# Patient Record
Sex: FEMALE | Race: WHITE | HISPANIC OR LATINO | Employment: FULL TIME | URBAN - METROPOLITAN AREA
[De-identification: names, ages, dates, MRNs, and addresses within clinical notes are randomized per-mention and may not be internally consistent; named-entity substitution may affect disease eponyms.]

---

## 2020-06-25 ENCOUNTER — TELEPHONE (OUTPATIENT)
Dept: ORTHOPEDICS | Facility: CLINIC | Age: 60
End: 2020-06-25

## 2020-06-25 DIAGNOSIS — M79.641 RIGHT HAND PAIN: Primary | ICD-10-CM

## 2020-06-26 ENCOUNTER — TELEPHONE (OUTPATIENT)
Dept: ORTHOPEDICS | Facility: CLINIC | Age: 60
End: 2020-06-26

## 2020-06-26 NOTE — TELEPHONE ENCOUNTER
Call pt several times at both phone # to reschedule appt unable to reach her. She does not have mychart.

## 2020-07-10 ENCOUNTER — HOSPITAL ENCOUNTER (OUTPATIENT)
Dept: RADIOLOGY | Facility: HOSPITAL | Age: 60
Discharge: HOME | End: 2020-07-10
Attending: PHYSICIAN ASSISTANT
Payer: COMMERCIAL

## 2020-07-10 ENCOUNTER — OFFICE VISIT (OUTPATIENT)
Dept: ORTHOPEDICS | Facility: CLINIC | Age: 60
End: 2020-07-10
Payer: COMMERCIAL

## 2020-07-10 DIAGNOSIS — G56.03 BILATERAL CARPAL TUNNEL SYNDROME: ICD-10-CM

## 2020-07-10 DIAGNOSIS — M79.641 RIGHT HAND PAIN: ICD-10-CM

## 2020-07-10 DIAGNOSIS — M25.531 WRIST PAIN, RIGHT: Primary | ICD-10-CM

## 2020-07-10 PROCEDURE — 73130 X-RAY EXAM OF HAND: CPT | Mod: TC,PN,RT

## 2020-07-10 PROCEDURE — 73130 XR HAND COMPLETE 3 VIEW RIGHT: ICD-10-PCS | Mod: 26,RT,, | Performed by: RADIOLOGY

## 2020-07-10 PROCEDURE — 73130 X-RAY EXAM OF HAND: CPT | Mod: 26,RT,, | Performed by: RADIOLOGY

## 2020-07-10 PROCEDURE — 99202 OFFICE O/P NEW SF 15 MIN: CPT | Mod: S$GLB,,, | Performed by: PHYSICIAN ASSISTANT

## 2020-07-10 PROCEDURE — 99202 PR OFFICE/OUTPT VISIT, NEW, LEVL II, 15-29 MIN: ICD-10-PCS | Mod: S$GLB,,, | Performed by: PHYSICIAN ASSISTANT

## 2020-07-10 PROCEDURE — 99999 PR PBB SHADOW E&M-EST. PATIENT-LVL III: ICD-10-PCS | Mod: PBBFAC,,, | Performed by: PHYSICIAN ASSISTANT

## 2020-07-10 PROCEDURE — 99999 PR PBB SHADOW E&M-EST. PATIENT-LVL III: CPT | Mod: PBBFAC,,, | Performed by: PHYSICIAN ASSISTANT

## 2020-07-10 RX ORDER — MELOXICAM 7.5 MG/1
7.5 TABLET ORAL DAILY
COMMUNITY

## 2020-07-10 RX ORDER — OLMESARTAN MEDOXOMIL 20 MG/1
20 TABLET ORAL DAILY
COMMUNITY
End: 2020-08-12

## 2020-07-10 RX ORDER — GABAPENTIN 300 MG/1
300 CAPSULE ORAL NIGHTLY
COMMUNITY

## 2020-07-10 RX ORDER — LORATADINE 10 MG/1
10 TABLET ORAL DAILY PRN
COMMUNITY

## 2020-07-10 RX ORDER — FLUTICASONE PROPIONATE 50 MCG
1 SPRAY, SUSPENSION (ML) NASAL DAILY
COMMUNITY

## 2020-07-10 NOTE — PROGRESS NOTES
Subjective:      Patient ID: Gaby Ennis is a 59 y.o. female.    Chief Complaint: Pain of the Right Hand      HPI: Gaby Ennis is a 59-year-old female in clinic today for right wrist pain and bilateral hand pain.  Patient states that about 20 years ago she fractured her right distal ulna, but it did not require surgery.  She has not experience any problems from that, but recently her right wrist has begun hurting and she is unsure if it is related or not.  No other history of trauma.  Patient also states that sometimes her thumb index and middle finger of her bilateral hands will go numb and it will sometimes wake her up at night.  She states that she has gotten braces that she wears at night which help her sleep comfortably.  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  She has not received any treatment for this.    History reviewed. No pertinent past medical history.    Current Outpatient Medications:     fluticasone propionate (FLONASE) 50 mcg/actuation nasal spray, 1 spray by Each Nostril route once daily., Disp: , Rfl:     gabapentin (NEURONTIN) 300 MG capsule, Take 300 mg by mouth every evening., Disp: , Rfl:     loratadine (CLARITIN) 10 mg tablet, Take 10 mg by mouth once daily., Disp: , Rfl:     meloxicam (MOBIC) 7.5 MG tablet, Take 7.5 mg by mouth once daily., Disp: , Rfl:     olmesartan (BENICAR) 20 MG tablet, Take 20 mg by mouth once daily., Disp: , Rfl:   Review of patient's allergies indicates:   Allergen Reactions    Pcn [penicillins] Anaphylaxis       There were no vitals taken for this visit.    Review of Systems   Constitution: Negative for chills and fever.   HENT: Negative for congestion and hoarse voice.    Eyes: Negative for discharge and redness.   Cardiovascular: Negative for chest pain and palpitations.   Respiratory: Negative for cough and wheezing.    Skin: Negative for itching  and rash.   Musculoskeletal: Positive for joint pain. Negative for arthritis, joint swelling and stiffness.   Gastrointestinal: Negative for diarrhea, nausea and vomiting.   Neurological: Positive for numbness. Negative for dizziness.   Psychiatric/Behavioral: Negative for altered mental status.   Allergic/Immunologic: Negative for environmental allergies and persistent infections.           Objective:    Ortho Exam   Right wrist:  No TTP  No swelling is noted  ROM is full  Negative Finkelstein test    Bilateral hands:  Negative Tinel  Negative Phalen  Sensation is decreased over the thumb index and middle finger  Pulses are intact   strength is normal  ROM is full    GEN: Well developed, well nourished female. AAOX3. No acute distress.   Normocephalic, atraumatic.   ERIC  Breathing unlabored.  Mood and affect appropriate.        Assessment:     Imaging:  X-ray right hand obtained today shows no acute fracture dislocation.  No degenerative changes are noted        1. Wrist pain, right    2. Bilateral carpal tunnel syndrome          Plan:       I suggested we performed corticosteroid injection for the bilateral carpal tunnel and potentially the wrist pain as well, but the patient denied this saying that she would like to try something less invasive to start.  I suggested that we try physical therapy and the patient thinks this is a good idea.  We will send her to therapy for her bilateral hands and right wrist and re-evaluate in 6 weeks.  At that time we may consider corticosteroid injection.  Patient understands that there is potential that the carpal tunnel will require surgery if corticosteroid injections fail.    Orders Placed This Encounter    Ambulatory referral/consult to Physical/Occupational Therapy     Follow up in about 6 weeks (around 8/21/2020).

## 2020-07-10 NOTE — LETTER
July 10, 2020      Drake Mathur MD  [address]  [address]  [place] [address]           [place] [facility]  [address], [address]  [facility] [address]  Phone: [phone]          Patient: Gaby Ennis   MR Number: [id number]   YOB: 1960   Date of Visit: 7/10/2020       Dear Dr. Drake Mathur:    Thank you for referring Gaby Ennis to me for evaluation. Attached you will find relevant portions of my assessment and plan of care.    If you have questions, please do not hesitate to call me. I look forward to following Gaby Ennis along with you.    Sincerely,    Alfredo Nash PA-C    Enclosure  CC:  No Recipients    If you would like to receive this communication electronically, please contact [email or web address] or [phone] to request more information on [employer] Link access.    For providers and/or their staff who would like to refer a patient to [facility], please contact us through our one-stop-shop provider referral line, [facility], at [phone].    If you feel you have received this communication in error or would no longer like to receive these types of communications, please e-mail [email or web address]

## 2020-08-12 ENCOUNTER — OFFICE VISIT (OUTPATIENT)
Dept: CARDIOLOGY | Facility: CLINIC | Age: 60
End: 2020-08-12
Payer: COMMERCIAL

## 2020-08-12 VITALS
OXYGEN SATURATION: 95 % | WEIGHT: 287.25 LBS | HEART RATE: 84 BPM | SYSTOLIC BLOOD PRESSURE: 141 MMHG | BODY MASS INDEX: 49.04 KG/M2 | HEIGHT: 64 IN | DIASTOLIC BLOOD PRESSURE: 75 MMHG

## 2020-08-12 DIAGNOSIS — I10 ESSENTIAL HYPERTENSION: ICD-10-CM

## 2020-08-12 DIAGNOSIS — E66.01 MORBID OBESITY: ICD-10-CM

## 2020-08-12 DIAGNOSIS — M17.0 OSTEOARTHRITIS OF BOTH KNEES, UNSPECIFIED OSTEOARTHRITIS TYPE: ICD-10-CM

## 2020-08-12 DIAGNOSIS — R69 DIAGNOSIS DEFERRED: ICD-10-CM

## 2020-08-12 PROCEDURE — 99204 PR OFFICE/OUTPT VISIT, NEW, LEVL IV, 45-59 MIN: ICD-10-PCS | Mod: S$GLB,,, | Performed by: INTERNAL MEDICINE

## 2020-08-12 PROCEDURE — 93010 ELECTROCARDIOGRAM REPORT: CPT | Mod: S$GLB,,, | Performed by: INTERNAL MEDICINE

## 2020-08-12 PROCEDURE — 93005 EKG 12-LEAD: ICD-10-PCS | Mod: S$GLB,,, | Performed by: INTERNAL MEDICINE

## 2020-08-12 PROCEDURE — 93010 EKG 12-LEAD: ICD-10-PCS | Mod: S$GLB,,, | Performed by: INTERNAL MEDICINE

## 2020-08-12 PROCEDURE — 99999 PR PBB SHADOW E&M-EST. PATIENT-LVL IV: ICD-10-PCS | Mod: PBBFAC,,, | Performed by: INTERNAL MEDICINE

## 2020-08-12 PROCEDURE — 3008F PR BODY MASS INDEX (BMI) DOCUMENTED: ICD-10-PCS | Mod: CPTII,S$GLB,, | Performed by: INTERNAL MEDICINE

## 2020-08-12 PROCEDURE — 99999 PR PBB SHADOW E&M-EST. PATIENT-LVL IV: CPT | Mod: PBBFAC,,, | Performed by: INTERNAL MEDICINE

## 2020-08-12 PROCEDURE — 99204 OFFICE O/P NEW MOD 45 MIN: CPT | Mod: S$GLB,,, | Performed by: INTERNAL MEDICINE

## 2020-08-12 PROCEDURE — 93005 ELECTROCARDIOGRAM TRACING: CPT | Mod: S$GLB,,, | Performed by: INTERNAL MEDICINE

## 2020-08-12 PROCEDURE — 3008F BODY MASS INDEX DOCD: CPT | Mod: CPTII,S$GLB,, | Performed by: INTERNAL MEDICINE

## 2020-08-12 RX ORDER — HYDROCHLOROTHIAZIDE 12.5 MG/1
12.5 CAPSULE ORAL DAILY
COMMUNITY
Start: 2020-07-28

## 2020-08-12 RX ORDER — BILBERRY 100 MG
1 CAPSULE ORAL DAILY
COMMUNITY

## 2020-08-12 RX ORDER — DILTIAZEM HYDROCHLORIDE 120 MG/1
120 CAPSULE, COATED, EXTENDED RELEASE ORAL DAILY
Qty: 30 CAPSULE | Refills: 6 | Status: SHIPPED | OUTPATIENT
Start: 2020-08-12 | End: 2021-02-11

## 2020-08-12 RX ORDER — DULOXETIN HYDROCHLORIDE 30 MG/1
30 CAPSULE, DELAYED RELEASE ORAL 2 TIMES DAILY
COMMUNITY
Start: 2020-07-07

## 2020-08-12 RX ORDER — MONTELUKAST SODIUM 10 MG/1
10 TABLET ORAL DAILY
COMMUNITY
Start: 2020-06-25

## 2020-08-12 RX ORDER — DILTIAZEM HYDROCHLORIDE 120 MG/1
120 CAPSULE, COATED, EXTENDED RELEASE ORAL DAILY
COMMUNITY
End: 2020-08-12 | Stop reason: SDUPTHER

## 2020-08-12 NOTE — PROGRESS NOTES
Subjective:   Patient ID:  Gaby Ennis is a 60 y.o. female who presents for evaulation of Hypertension      HPI: Hypertensive [place] from [facility] 1 and a half year ago. Lived there for 10 years. Was followed regularly by a physician. She has a long standing hypertension. Was on Benicar, but upon return from [facility] it was changed to the generic Olmesartan. Patient developed peripheral edema and discontinued the medication.  She is now on Hctz.  Patient states that since she has been back she gained 40 lbs.  She is inactive, (although occasionally uses stationary bike ) due to difficulty ambulating due to DJD of both knees.  Her PCP is at [facility].  Patient c/o fatigue, leg swelling. She thinks she might be snoring at night, but is using Flonase and is not sure.    She has positive family history of CAD. Patient is not a smoker or a drinker.     History reviewed. No pertinent past medical history.    History reviewed. No pertinent surgical history.     ECG is normal.    Blood work is pending    Social History     Socioeconomic History    Marital status: Single     Spouse name: Not on file    Number of children: Not on file    Years of education: Not on file    Highest education level: Not on file   Occupational History    Not on file   Social Needs    Financial resource strain: Not on file    Food insecurity     Worry: Not on file     Inability: Not on file    Transportation needs     Medical: Not on file     Non-medical: Not on file   Tobacco Use    Smoking status: Never Smoker    Smokeless tobacco: Never Used   Substance and Sexual Activity    Alcohol use: Not Currently    Drug use: Not on file    Sexual activity: Not on file   Lifestyle    Physical activity     Days per week: Not on file     Minutes per session: Not on file    Stress: Not on file   Relationships    Social connections     Talks on phone: Not on file     Gets together: Not on file     Attends Anabaptism  "service: Not on file     Active member of club or organization: Not on file     Attends meetings of clubs or organizations: Not on file     Relationship status: Not on file   Other Topics Concern    Not on file   Social History Narrative    Not on file       Review of patient's allergies indicates:   Allergen Reactions    Pcn [penicillins] Anaphylaxis       No results found for: NA, K, CL, CO2, BUN, CREATININE, GLU, HGBA1C, MG, AST, ALT, ALBUMIN, PROT, BILITOT, WBC, HGB, HCT, MCV, PLT, INR, PSA, TSH, CHOL, HDL, LDLCALC, TRIG    Review of Systems   Constitution: Positive for malaise/fatigue and weight gain.   HENT: Positive for hearing loss.    Eyes: Negative.    Cardiovascular: Positive for dyspnea on exertion and leg swelling. Negative for chest pain, claudication, irregular heartbeat, near-syncope, palpitations and syncope.   Respiratory: Positive for wheezing. Negative for cough, shortness of breath and snoring.    Endocrine: Negative.  Negative for cold intolerance, heat intolerance, polydipsia, polyphagia and polyuria.   Skin: Negative.    Musculoskeletal: Positive for arthritis, back pain, joint pain and muscle cramps.   Gastrointestinal: Positive for constipation and heartburn.   Genitourinary: Negative.    Neurological: Positive for excessive daytime sleepiness, headaches, numbness and paresthesias.   Psychiatric/Behavioral: The patient is nervous/anxious.        Objective:   Physical Exam   Constitutional: She is oriented to person, place, and time. She appears well-developed and well-nourished.   BP (!) 141/75   Pulse 84   Ht 5' 4" (1.626 m)   Wt 130.3 kg (287 lb 4.2 oz)   SpO2 95%   BMI 49.31 kg/m²      HENT:   Head: Normocephalic.   Eyes: Pupils are equal, round, and reactive to light.   Neck: Normal range of motion. Neck supple. Carotid bruit is not present. No thyromegaly present.   Cardiovascular: Normal rate, regular rhythm, normal heart sounds and intact distal pulses. Exam reveals no gallop " and no friction rub.   No murmur heard.  Pulses:       Carotid pulses are 2+ on the right side and 2+ on the left side.       Radial pulses are 2+ on the right side and 2+ on the left side.        Femoral pulses are 2+ on the right side and 2+ on the left side.       Popliteal pulses are 2+ on the right side and 2+ on the left side.        Dorsalis pedis pulses are 2+ on the right side and 2+ on the left side.        Posterior tibial pulses are 2+ on the right side and 2+ on the left side.   Pulmonary/Chest: Effort normal and breath sounds normal. No respiratory distress. She has no wheezes. She has no rales. She exhibits no tenderness.   Abdominal: Soft. Bowel sounds are normal.   obese   Musculoskeletal: Normal range of motion.         General: No edema.      Comments: Varicose veins and venous stasis changes   Neurological: She is alert and oriented to person, place, and time.   Skin: Skin is warm and dry.   Psychiatric: She has a normal mood and affect.   Nursing note and vitals reviewed.      Current Outpatient Medications   Medication Sig    ALPHA LIPOIC ACID ORAL Take 1 capsule by mouth once daily. Pt unsure of dosage.    bilberry 100 mg Cap Take 1 capsule by mouth once daily.    cat's claw, uncaria tomentosa, 400 mg Cap Take 1 capsule by mouth once daily.    diltiaZEM (CARDIZEM CD) 120 MG Cp24 Take 1 capsule (120 mg total) by mouth once daily.    DULoxetine (CYMBALTA) 30 MG capsule Take 30 mg by mouth once daily.    fluticasone propionate (FLONASE) 50 mcg/actuation nasal spray 1 spray by Each Nostril route once daily.    gabapentin (NEURONTIN) 300 MG capsule Take 300 mg by mouth every evening.    hydroCHLOROthiazide (MICROZIDE) 12.5 mg capsule Take 12.5 mg by mouth once daily.    loratadine (CLARITIN) 10 mg tablet Take 10 mg by mouth once daily.    meloxicam (MOBIC) 7.5 MG tablet Take 7.5 mg by mouth once daily.    montelukast (SINGULAIR) 10 mg tablet Take 10 mg by mouth as needed.     No current  facility-administered medications for this visit.        Assessment and Plan:     Problem List Items Addressed This Visit        Cardiology Problems    Essential hypertension    Relevant Medications    diltiaZEM (CARDIZEM CD) 120 MG Cp24    Other Relevant Orders    IN OFFICE EKG 12-LEAD (to Muse)    Lipid Panel    TSH    Comprehensive metabolic panel    Hemoglobin A1C    Echo Color Flow Doppler? Yes    Cardiac PET Scan Stress       Other    Morbid obesity    Relevant Medications    diltiaZEM (CARDIZEM CD) 120 MG Cp24    Other Relevant Orders    IN OFFICE EKG 12-LEAD (to Muse)    Lipid Panel    TSH    Comprehensive metabolic panel    Hemoglobin A1C    Echo Color Flow Doppler? Yes    Cardiac PET Scan Stress    Osteoarthritis of both knees    Relevant Orders    Cardiac PET Scan Stress      Other Visit Diagnoses     Diagnosis deferred        Relevant Orders    IN OFFICE EKG 12-LEAD (to Muse)    Cardiac PET Scan Stress          Patient's Medications   New Prescriptions    No medications on file   Previous Medications    ALPHA LIPOIC ACID ORAL    Take 1 capsule by mouth once daily. Pt unsure of dosage.    BILBERRY 100 MG CAP    Take 1 capsule by mouth once daily.    CAT'S CLAW, UNCARIA TOMENTOSA, 400 MG CAP    Take 1 capsule by mouth once daily.    DULOXETINE (CYMBALTA) 30 MG CAPSULE    Take 30 mg by mouth once daily.    FLUTICASONE PROPIONATE (FLONASE) 50 MCG/ACTUATION NASAL SPRAY    1 spray by Each Nostril route once daily.    GABAPENTIN (NEURONTIN) 300 MG CAPSULE    Take 300 mg by mouth every evening.    HYDROCHLOROTHIAZIDE (MICROZIDE) 12.5 MG CAPSULE    Take 12.5 mg by mouth once daily.    LORATADINE (CLARITIN) 10 MG TABLET    Take 10 mg by mouth once daily.    MELOXICAM (MOBIC) 7.5 MG TABLET    Take 7.5 mg by mouth once daily.    MONTELUKAST (SINGULAIR) 10 MG TABLET    Take 10 mg by mouth as needed.   Modified Medications    Modified Medication Previous Medication    DILTIAZEM (CARDIZEM CD) 120 MG CP24 diltiaZEM  (CARDIZEM CD) 120 MG Cp24       Take 1 capsule (120 mg total) by mouth once daily.    Take 120 mg by mouth once daily.   Discontinued Medications    OLMESARTAN (BENICAR) 20 MG TABLET    Take 20 mg by mouth once daily.   Add Diltiazem  mg daily and monitor BP  Review ordered tests and advise.  Encouraged to discuss KAREN work up with her PCP.  Life style modifications weight loss and exercise as well as bariatric surgery discussed with the patient.  Follow up in about 6 months (around 2/12/2021).

## 2020-08-12 NOTE — LETTER
August 12, 2020      Drake Mathur MD  [address]  [address]  [place] [address]           [place] [facility]  [address]  [place] [address]  Phone: [phone]          Patient: Gaby Ennis   MR Number: [id number]   YOB: 1960   Date of Visit: 8/12/2020       Dear Dr. Drake Mathur:    Thank you for referring Gaby Ennis to me for evaluation. Attached you will find relevant portions of my assessment and plan of care.    If you have questions, please do not hesitate to call me. I look forward to following Gaby Ennis along with you.    Sincerely,    Brenda Pappas MD    Enclosure  CC:  No Recipients    If you would like to receive this communication electronically, please contact [email or web address] or [phone] to request more information on [employer] Link access.    For providers and/or their staff who would like to refer a patient to [facility], please contact us through our one-stop-shop provider referral line, [facility] , at [phone].    If you feel you have received this communication in error or would no longer like to receive these types of communications, please e-mail [email or web address]

## 2020-09-15 ENCOUNTER — CLINICAL SUPPORT (OUTPATIENT)
Dept: CARDIOLOGY | Facility: CLINIC | Age: 60
End: 2020-09-15
Attending: INTERNAL MEDICINE
Payer: COMMERCIAL

## 2020-09-15 ENCOUNTER — LAB VISIT (OUTPATIENT)
Dept: LAB | Facility: HOSPITAL | Age: 60
End: 2020-09-15
Attending: INTERNAL MEDICINE
Payer: COMMERCIAL

## 2020-09-15 ENCOUNTER — TELEPHONE (OUTPATIENT)
Dept: CARDIOLOGY | Facility: CLINIC | Age: 60
End: 2020-09-15

## 2020-09-15 VITALS
SYSTOLIC BLOOD PRESSURE: 94 MMHG | BODY MASS INDEX: 49 KG/M2 | WEIGHT: 287 LBS | DIASTOLIC BLOOD PRESSURE: 60 MMHG | HEIGHT: 64 IN | HEART RATE: 60 BPM

## 2020-09-15 DIAGNOSIS — I10 ESSENTIAL HYPERTENSION: ICD-10-CM

## 2020-09-15 DIAGNOSIS — E66.01 MORBID OBESITY: ICD-10-CM

## 2020-09-15 LAB
ALBUMIN SERPL BCP-MCNC: 3.8 G/DL (ref 3.5–5.2)
ALP SERPL-CCNC: 86 U/L (ref 55–135)
ALT SERPL W/O P-5'-P-CCNC: 50 U/L (ref 10–44)
ANION GAP SERPL CALC-SCNC: 11 MMOL/L (ref 8–16)
ASCENDING AORTA: 3.24 CM
AST SERPL-CCNC: 39 U/L (ref 10–40)
AV INDEX (PROSTH): 0.63
AV MEAN GRADIENT: 8 MMHG
AV PEAK GRADIENT: 13 MMHG
AV VALVE AREA: 1.75 CM2
AV VELOCITY RATIO: 0.57
BILIRUB SERPL-MCNC: 0.5 MG/DL (ref 0.1–1)
BSA FOR ECHO PROCEDURE: 2.42 M2
BUN SERPL-MCNC: 14 MG/DL (ref 6–20)
CALCIUM SERPL-MCNC: 8.9 MG/DL (ref 8.7–10.5)
CHLORIDE SERPL-SCNC: 105 MMOL/L (ref 95–110)
CHOLEST SERPL-MCNC: 215 MG/DL (ref 120–199)
CHOLEST/HDLC SERPL: 4.2 {RATIO} (ref 2–5)
CO2 SERPL-SCNC: 25 MMOL/L (ref 23–29)
CREAT SERPL-MCNC: 0.9 MG/DL (ref 0.5–1.4)
CV ECHO LV RWT: 0.35 CM
DOP CALC AO PEAK VEL: 1.83 M/S
DOP CALC AO VTI: 40.24 CM
DOP CALC LVOT AREA: 2.8 CM2
DOP CALC LVOT DIAMETER: 1.88 CM
DOP CALC LVOT PEAK VEL: 1.05 M/S
DOP CALC LVOT STROKE VOLUME: 70.53 CM3
DOP CALCLVOT PEAK VEL VTI: 25.42 CM
E WAVE DECELERATION TIME: 251.51 MSEC
E/A RATIO: 0.81
E/E' RATIO: 10.8 M/S
ECHO LV POSTERIOR WALL: 0.86 CM (ref 0.6–1.1)
EST. GFR  (AFRICAN AMERICAN): >60 ML/MIN/1.73 M^2
EST. GFR  (NON AFRICAN AMERICAN): >60 ML/MIN/1.73 M^2
ESTIMATED AVG GLUCOSE: 123 MG/DL (ref 68–131)
FRACTIONAL SHORTENING: 40 % (ref 28–44)
GLUCOSE SERPL-MCNC: 97 MG/DL (ref 70–110)
HBA1C MFR BLD HPLC: 5.9 % (ref 4–5.6)
HDLC SERPL-MCNC: 51 MG/DL (ref 40–75)
HDLC SERPL: 23.7 % (ref 20–50)
INTERVENTRICULAR SEPTUM: 0.91 CM (ref 0.6–1.1)
IVRT: 105.61 MSEC
LA MAJOR: 5.83 CM
LA MINOR: 5.7 CM
LA WIDTH: 3.96 CM
LDLC SERPL CALC-MCNC: 136.2 MG/DL (ref 63–159)
LEFT ATRIUM SIZE: 3.81 CM
LEFT ATRIUM VOLUME INDEX: 32.4 ML/M2
LEFT ATRIUM VOLUME: 73.92 CM3
LEFT INTERNAL DIMENSION IN SYSTOLE: 2.99 CM (ref 2.1–4)
LEFT VENTRICLE DIASTOLIC VOLUME INDEX: 51.26 ML/M2
LEFT VENTRICLE DIASTOLIC VOLUME: 116.9 ML
LEFT VENTRICLE MASS INDEX: 67 G/M2
LEFT VENTRICLE SYSTOLIC VOLUME INDEX: 15.3 ML/M2
LEFT VENTRICLE SYSTOLIC VOLUME: 34.78 ML
LEFT VENTRICULAR INTERNAL DIMENSION IN DIASTOLE: 4.98 CM (ref 3.5–6)
LEFT VENTRICULAR MASS: 153.72 G
LV LATERAL E/E' RATIO: 9 M/S
LV SEPTAL E/E' RATIO: 13.5 M/S
MV PEAK A VEL: 1 M/S
MV PEAK E VEL: 0.81 M/S
MV STENOSIS PRESSURE HALF TIME: 72.94 MS
MV VALVE AREA P 1/2 METHOD: 3.02 CM2
NONHDLC SERPL-MCNC: 164 MG/DL
PISA TR MAX VEL: 2.63 M/S
POTASSIUM SERPL-SCNC: 4.2 MMOL/L (ref 3.5–5.1)
PROT SERPL-MCNC: 7.2 G/DL (ref 6–8.4)
PULM VEIN S/D RATIO: 1.37
PV PEAK D VEL: 0.46 M/S
PV PEAK S VEL: 0.63 M/S
RA MAJOR: 4.76 CM
RA PRESSURE: 3 MMHG
RA WIDTH: 3.35 CM
RIGHT VENTRICULAR END-DIASTOLIC DIMENSION: 2.77 CM
SINUS: 3.15 CM
SODIUM SERPL-SCNC: 141 MMOL/L (ref 136–145)
STJ: 2.75 CM
TDI LATERAL: 0.09 M/S
TDI SEPTAL: 0.06 M/S
TDI: 0.08 M/S
TR MAX PG: 28 MMHG
TRICUSPID ANNULAR PLANE SYSTOLIC EXCURSION: 2.3 CM
TRIGL SERPL-MCNC: 139 MG/DL (ref 30–150)
TSH SERPL DL<=0.005 MIU/L-ACNC: 2.09 UIU/ML (ref 0.4–4)
TV REST PULMONARY ARTERY PRESSURE: 31 MMHG

## 2020-09-15 PROCEDURE — 99999 PR PBB SHADOW E&M-EST. PATIENT-LVL II: ICD-10-PCS | Mod: PBBFAC,,,

## 2020-09-15 PROCEDURE — 80053 COMPREHEN METABOLIC PANEL: CPT

## 2020-09-15 PROCEDURE — 84443 ASSAY THYROID STIM HORMONE: CPT

## 2020-09-15 PROCEDURE — 80061 LIPID PANEL: CPT

## 2020-09-15 PROCEDURE — 99999 PR PBB SHADOW E&M-EST. PATIENT-LVL II: CPT | Mod: PBBFAC,,,

## 2020-09-15 PROCEDURE — 83036 HEMOGLOBIN GLYCOSYLATED A1C: CPT

## 2020-09-15 PROCEDURE — 36415 COLL VENOUS BLD VENIPUNCTURE: CPT | Mod: PO

## 2020-09-15 NOTE — TELEPHONE ENCOUNTER
Lian notified, verbalized understanding. Lian gave me pt's phone number [phone]. Pt notified, verbalized understanding. Pt stated that her systolic bp was in 140's last week at a doctor appointment outside of [facility] and in the 90's today. Pt stated that she feels better and not having chest pressure. Please advise.

## 2020-09-15 NOTE — TELEPHONE ENCOUNTER
----- Message from Brenda Pappas MD sent at 9/15/2020 10:55 AM CDT -----  Please inform the patient that that her cardiac echo was normal. Great news.  How is she doing on a new medication? Is BP better controlled/

## 2020-09-15 NOTE — TELEPHONE ENCOUNTER
----- Message from Brenda Pappas MD sent at 9/15/2020  3:06 PM CDT -----  Please inform the patient that her lipids are not at goal.  She has increased 10 yr risk of CAD.  I would recommend moderate intensity statin.  If patient agrees please prescribe Lipitor 20 mg daily and repeat lipids and LFT's in 2 months.

## 2020-09-16 DIAGNOSIS — I10 ESSENTIAL HYPERTENSION: Primary | ICD-10-CM

## 2020-09-16 DIAGNOSIS — E66.01 MORBID OBESITY: ICD-10-CM

## 2020-09-16 RX ORDER — ATORVASTATIN CALCIUM 20 MG/1
20 TABLET, FILM COATED ORAL DAILY
Qty: 30 TABLET | Refills: 6 | Status: SHIPPED | OUTPATIENT
Start: 2020-09-16 | End: 2021-04-08

## 2020-09-16 RX ORDER — ATORVASTATIN CALCIUM 20 MG/1
20 TABLET, FILM COATED ORAL DAILY
COMMUNITY
End: 2020-09-16 | Stop reason: SDUPTHER

## 2020-09-16 NOTE — TELEPHONE ENCOUNTER
Pt notified, verbalized understanding. Pt stated that she is not going to do the pet stress test at this time because she can't afford it. Dr. Pappas made aware.

## 2020-11-27 ENCOUNTER — LAB VISIT (OUTPATIENT)
Dept: LAB | Facility: HOSPITAL | Age: 60
End: 2020-11-27
Attending: INTERNAL MEDICINE
Payer: COMMERCIAL

## 2020-11-27 DIAGNOSIS — I10 ESSENTIAL HYPERTENSION: ICD-10-CM

## 2020-11-27 DIAGNOSIS — E66.01 MORBID OBESITY: ICD-10-CM

## 2020-11-27 LAB
ALBUMIN SERPL BCP-MCNC: 3.6 G/DL (ref 3.5–5.2)
ALP SERPL-CCNC: 99 U/L (ref 55–135)
ALT SERPL W/O P-5'-P-CCNC: 43 U/L (ref 10–44)
AST SERPL-CCNC: 34 U/L (ref 10–40)
BILIRUB DIRECT SERPL-MCNC: 0.2 MG/DL (ref 0.1–0.3)
BILIRUB SERPL-MCNC: 0.5 MG/DL (ref 0.1–1)
CHOLEST SERPL-MCNC: 140 MG/DL (ref 120–199)
CHOLEST/HDLC SERPL: 2.5 {RATIO} (ref 2–5)
HDLC SERPL-MCNC: 56 MG/DL (ref 40–75)
HDLC SERPL: 40 % (ref 20–50)
LDLC SERPL CALC-MCNC: 68.4 MG/DL (ref 63–159)
NONHDLC SERPL-MCNC: 84 MG/DL
PROT SERPL-MCNC: 6.8 G/DL (ref 6–8.4)
TRIGL SERPL-MCNC: 78 MG/DL (ref 30–150)

## 2020-11-27 PROCEDURE — 80061 LIPID PANEL: CPT

## 2020-11-27 PROCEDURE — 80076 HEPATIC FUNCTION PANEL: CPT

## 2020-11-27 PROCEDURE — 36415 COLL VENOUS BLD VENIPUNCTURE: CPT | Mod: PO

## 2020-11-30 ENCOUNTER — TELEPHONE (OUTPATIENT)
Dept: CARDIOLOGY | Facility: CLINIC | Age: 60
End: 2020-11-30

## 2020-11-30 NOTE — TELEPHONE ENCOUNTER
Pt notified of results, verbalized understanding.     ----- Message from Brenda Pappas MD sent at 11/30/2020 10:52 AM CST -----  Please inform the patient that lipids look very good.

## 2020-12-11 ENCOUNTER — TELEPHONE (OUTPATIENT)
Dept: VASCULAR SURGERY | Facility: CLINIC | Age: 60
End: 2020-12-11

## 2020-12-17 ENCOUNTER — TELEPHONE (OUTPATIENT)
Dept: CARDIOLOGY | Facility: CLINIC | Age: 60
End: 2020-12-17

## 2020-12-17 NOTE — TELEPHONE ENCOUNTER
----- Message from Yasmin Kee sent at 12/17/2020  9:49 AM CST -----  Regarding: advice  Pls call pt work [phone].  She says the swelling in her feet is getting worse and needs advice on what she should do.    Thank you

## 2020-12-17 NOTE — TELEPHONE ENCOUNTER
Pt called stating that she has been having swelling in feet. Pt stated that she saw an orthopedic doctor outside of [facility] and was advised to go to a vascular doctor and has not talked to anyone to schedule an appointment. Pt stated that she is watching her salt intake and has been elevating her feet at home and it helps a little. Pt wants to know what she can do because the swelling is getting worse. Please advise.

## 2020-12-21 ENCOUNTER — OFFICE VISIT (OUTPATIENT)
Dept: CARDIOLOGY | Facility: CLINIC | Age: 60
End: 2020-12-21
Payer: COMMERCIAL

## 2020-12-21 VITALS
HEART RATE: 68 BPM | WEIGHT: 286.63 LBS | SYSTOLIC BLOOD PRESSURE: 134 MMHG | DIASTOLIC BLOOD PRESSURE: 70 MMHG | BODY MASS INDEX: 46.06 KG/M2 | HEIGHT: 66 IN

## 2020-12-21 DIAGNOSIS — E66.01 MORBID OBESITY: ICD-10-CM

## 2020-12-21 DIAGNOSIS — I89.0 LYMPHEDEMA OF BOTH LOWER EXTREMITIES: ICD-10-CM

## 2020-12-21 DIAGNOSIS — M79.672 LEFT FOOT PAIN: ICD-10-CM

## 2020-12-21 DIAGNOSIS — I83.813 VARICOSE VEINS OF BOTH LOWER EXTREMITIES WITH PAIN: ICD-10-CM

## 2020-12-21 DIAGNOSIS — R60.0 EDEMA OF LEFT FOOT: Primary | ICD-10-CM

## 2020-12-21 DIAGNOSIS — I10 ESSENTIAL HYPERTENSION: ICD-10-CM

## 2020-12-21 PROBLEM — I83.93 VARICOSE VEINS OF BOTH LOWER EXTREMITIES: Status: ACTIVE | Noted: 2020-12-21

## 2020-12-21 PROCEDURE — 99999 PR PBB SHADOW E&M-EST. PATIENT-LVL IV: ICD-10-PCS | Mod: PBBFAC,,, | Performed by: INTERNAL MEDICINE

## 2020-12-21 PROCEDURE — 1125F AMNT PAIN NOTED PAIN PRSNT: CPT | Mod: S$GLB,,, | Performed by: INTERNAL MEDICINE

## 2020-12-21 PROCEDURE — 3008F BODY MASS INDEX DOCD: CPT | Mod: CPTII,S$GLB,, | Performed by: INTERNAL MEDICINE

## 2020-12-21 PROCEDURE — 99999 PR PBB SHADOW E&M-EST. PATIENT-LVL IV: CPT | Mod: PBBFAC,,, | Performed by: INTERNAL MEDICINE

## 2020-12-21 PROCEDURE — 3008F PR BODY MASS INDEX (BMI) DOCUMENTED: ICD-10-PCS | Mod: CPTII,S$GLB,, | Performed by: INTERNAL MEDICINE

## 2020-12-21 PROCEDURE — 99205 OFFICE O/P NEW HI 60 MIN: CPT | Mod: S$GLB,,, | Performed by: INTERNAL MEDICINE

## 2020-12-21 PROCEDURE — 1125F PR PAIN SEVERITY QUANTIFIED, PAIN PRESENT: ICD-10-PCS | Mod: S$GLB,,, | Performed by: INTERNAL MEDICINE

## 2020-12-21 PROCEDURE — 99205 PR OFFICE/OUTPT VISIT, NEW, LEVL V, 60-74 MIN: ICD-10-PCS | Mod: S$GLB,,, | Performed by: INTERNAL MEDICINE

## 2020-12-21 RX ORDER — LORAZEPAM 0.5 MG/1
0.5 TABLET ORAL ONCE
Qty: 1 TABLET | Refills: 0 | Status: SHIPPED | OUTPATIENT
Start: 2020-12-21 | End: 2020-12-21

## 2020-12-21 RX ORDER — BUMETANIDE 0.5 MG/1
0.5 TABLET ORAL 2 TIMES DAILY
Qty: 60 TABLET | Refills: 11 | Status: SHIPPED | OUTPATIENT
Start: 2020-12-21 | End: 2021-12-21

## 2020-12-21 NOTE — LETTER
December 21, 2020      Brenda Pappas MD  [address]  [address]  [place] [address]           [facility] 8th Fl  [address].  [place] [address]  Phone: [phone]          Patient: Gaby Ennis   MR Number: [id number]   YOB: 1960   Date of Visit: 12/21/2020       Dear Dr. Brenda Pappas:    Thank you for referring Gaby Ennis to me for evaluation. Attached you will find relevant portions of my assessment and plan of care.    If you have questions, please do not hesitate to call me. I look forward to following Gaby Ennis along with you.    Sincerely,    Lior Pham MD PhD    Enclosure  CC:  No Recipients    If you would like to receive this communication electronically, please contact [email or web address] or [phone] to request more information on [employer] Link access.    For providers and/or their staff who would like to refer a patient to [facility], please contact us through our one-stop-shop provider referral line, [facility], at [phone].    If you feel you have received this communication in error or would no longer like to receive these types of communications, please e-mail [email or web address]

## 2020-12-21 NOTE — PATIENT INSTRUCTIONS
Assessment/Plan:  Gaby Ennis is a 60 y.o. female with HTN, HLD, morbid obesity, who presents for an initial appointment.    1. Left foot edema with pain- Likely due to lymphedema with possible venous insufficiency.  Check BLE venous reflux study and arterial ultrasound.  Start bumex 0.5 mg bid.  Check MRI left foot with contrast to evaluate for possible structural abnormalities. Pt to elevate legs when resting.  Limit sodium intake to 2,000 mg/day.      2. Morbid Obesity- Refer to nutrition for assistance with weight loss.     Follow up in 1 month

## 2020-12-21 NOTE — PROGRESS NOTES
[facility]      Chief Complaint   Patient presents with    Foot Swelling       Patient ID: Gaby Ennis is a 60 y.o. female with HTN, HLD, morbid obesity, who presents for an initial appointment.  Pertinent history/events are as follows:     -Pt kindly referred by Dr. Pappas for evaluation of foot swelling.      HPI  Mrs. Ennis reports swelling of the left foot since 2/2020.  Swelling became worse over the past month.  Also reports constant pain in the left foot, which started this month (12/2020).  Exam shows BLE's with prominent varicose veins and changes consistent with lymphedema.  Pitting edema noted at left ankly.      No past medical history on file.  No past surgical history on file.  Social History     Socioeconomic History    Marital status: Single     Spouse name: Not on file    Number of children: Not on file    Years of education: Not on file    Highest education level: Not on file   Occupational History    Not on file   Social Needs    Financial resource strain: Not on file    Food insecurity     Worry: Not on file     Inability: Not on file    Transportation needs     Medical: Not on file     Non-medical: Not on file   Tobacco Use    Smoking status: Never Smoker    Smokeless tobacco: Never Used   Substance and Sexual Activity    Alcohol use: Not Currently    Drug use: Not on file    Sexual activity: Not on file   Lifestyle    Physical activity     Days per week: Not on file     Minutes per session: Not on file    Stress: Not on file   Relationships    Social connections     Talks on phone: Not on file     Gets together: Not on file     Attends [employer] service: Not on file     Active member of club or organization: Not on file     Attends meetings of clubs or organizations: Not on file     Relationship status: Not on file   Other Topics Concern    Not on file   Social History Narrative    Not on file     Family History   Problem Relation Age of Onset     Hyperlipidemia Mother     Heart attack Father     Heart disease Father     Hypertension Father     Hyperlipidemia Brother     Stroke Maternal Grandfather     Heart failure Neg Hx        Review of patient's allergies indicates:   Allergen Reactions    Pcn [penicillins] Anaphylaxis       Medication List with Changes/Refills   Current Medications    ALPHA LIPOIC ACID ORAL    Take 1 capsule by mouth once daily. Pt unsure of dosage.    ATORVASTATIN (LIPITOR) 20 MG TABLET    Take 1 tablet (20 mg total) by mouth once daily.    BILBERRY 100 MG CAP    Take 1 capsule by mouth once daily.    CAT'S CLAW, UNCARIA TOMENTOSA, 400 MG CAP    Take 1 capsule by mouth once daily.    DILTIAZEM (CARDIZEM CD) 120 MG CP24    Take 1 capsule (120 mg total) by mouth once daily.    DULOXETINE (CYMBALTA) 30 MG CAPSULE    Take 30 mg by mouth 2 (two) times daily.     FLUTICASONE PROPIONATE (FLONASE) 50 MCG/ACTUATION NASAL SPRAY    1 spray by Each Nostril route once daily.    GABAPENTIN (NEURONTIN) 300 MG CAPSULE    Take 300 mg by mouth every evening.    HYDROCHLOROTHIAZIDE (MICROZIDE) 12.5 MG CAPSULE    Take 12.5 mg by mouth once daily.    LORATADINE (CLARITIN) 10 MG TABLET    Take 10 mg by mouth daily as needed.     MELOXICAM (MOBIC) 7.5 MG TABLET    Take 7.5 mg by mouth once daily.    MONTELUKAST (SINGULAIR) 10 MG TABLET    Take 10 mg by mouth once daily.     NAPROXEN SODIUM (ALEVE ORAL)    Take 2 tablets by mouth once daily.    PHENAZOPYRIDINE HCL (AZO ORAL)    Take 1 tablet by mouth once daily.       Review of Systems  Constitution: Denies chills, fever, and sweats.  HENT: Denies headaches or blurry vision.  Cardiovascular: Denies chest pain or irregular heart beat.  Respiratory: Denies cough or shortness of breath.  Gastrointestinal: Denies abdominal pain, nausea, or vomiting.  Musculoskeletal: Positive for left foot swelling and pan.  Neurological: Denies dizziness or focal weakness.  Psychiatric/Behavioral: Normal mental  "status.  Hematologic/Lymphatic: Denies bleeding problem or easy bruising/bleeding.  Skin: Denies rash or suspicious lesions    Physical Examination  /70   Pulse 68   Ht 5' 6" (1.676 m)   Wt 130 kg (286 lb 9.6 oz)   BMI 46.26 kg/m²     Constitutional: Morbidly obese female, no acute distress, conversant  HEENT: Sclera anicteric, Pupils equal, round and reactive to light, extraocular motions intact, Oropharynx clear  Neck: No JVD, no carotid bruits  Cardiovascular: regular rate and rhythm, no murmur, rubs or gallops, normal S1/S2  Pulmonary: Clear to auscultation bilaterally  Abdominal: Abdomen soft, nontender, nondistended, positive bowel sounds  Extremities: BLE's with prominent varicose veins and changes consistent with lymphedema.  Pitting edema noted at left ankly.    Pulses:  Carotid pulses are 2+ on the right side, and 2+ on the left side.  Radial pulses are 2+ on the right side, and 2+ on the left side.   Femoral pulses are 2+ on the right side, and 2+ on the left side.  Popliteal pulses are 2+ on the right side, and 2+ on the left side.   Dorsalis pedis pulses are 2+ on the right side, and 2+ on the left side.   Posterior tibial pulses are 2+ on the right side, and 2+ on the left side.    Skin: No ecchymosis, erythema, or ulcers  Psych: Alert and oriented x 3, appropriate affect  Neuro: CNII-XII intact, no focal deficits    Labs:  Most Recent Data  CBC: No results found for: WBC, HGB, HCT, PLT, MCV, RDW  BMP:   Lab Results   Component Value Date     09/15/2020    K 4.2 09/15/2020     09/15/2020    CO2 25 09/15/2020    BUN 14 09/15/2020    CREATININE 0.9 09/15/2020    GLU 97 09/15/2020    CALCIUM 8.9 09/15/2020     LFTS;   Lab Results   Component Value Date    PROT 6.8 11/27/2020    ALBUMIN 3.6 11/27/2020    BILITOT 0.5 11/27/2020    AST 34 11/27/2020    ALKPHOS 99 11/27/2020    ALT 43 11/27/2020     COAGS: No results found for: INR, PROTIME, PTT  FLP:   Lab Results   Component Value Date "    CHOL 140 11/27/2020    HDL 56 11/27/2020    LDLCALC 68.4 11/27/2020    TRIG 78 11/27/2020    CHOLHDL 40.0 11/27/2020     CARDIAC: No results found for: TROPONINI, CKMB, BNP      Echo 9/15/2020:  · Normal left ventricular systolic function. The estimated ejection fraction is 65%.  · Normal right ventricular systolic function.  · Normal LV diastolic function.  · No wall motion abnormalities.  · The estimated PA systolic pressure is 31 mmHg.  · Normal central venous pressure (3 mmHg).      Assessment/Plan:  Gaby Ennis is a 60 y.o. female with HTN, HLD, morbid obesity, who presents for an initial appointment.    1. Left foot edema with pain- Likely due to lymphedema with possible venous insufficiency.  Check BLE venous reflux study and arterial ultrasound.  Start bumex 0.5 mg bid.  Check MRI left foot with contrast to evaluate for possible structural abnormalities. Pt to elevate legs when resting.  Limit sodium intake to 2,000 mg/day.      2. Morbid Obesity- Refer to nutrition for assistance with weight loss.     Follow up in 1 month    Total duration of face to face visit time 30 minutes.  Total time spent counseling greater than fifty percent of total visit time.  Counseling included discussion regarding imaging findings, diagnosis, possibilities, treatment options, risks and benefits.  The patient had many questions regarding the options and long-term effects.    Lior Pham MD, PhD  Interventional Cardiology

## 2021-01-13 ENCOUNTER — PATIENT MESSAGE (OUTPATIENT)
Dept: CARDIOLOGY | Facility: CLINIC | Age: 61
End: 2021-01-13

## 2021-01-14 ENCOUNTER — TELEPHONE (OUTPATIENT)
Dept: CARDIOLOGY | Facility: CLINIC | Age: 61
End: 2021-01-14

## 2021-02-17 ENCOUNTER — TELEPHONE (OUTPATIENT)
Dept: CARDIOLOGY | Facility: CLINIC | Age: 61
End: 2021-02-17

## 2021-04-16 ENCOUNTER — PATIENT MESSAGE (OUTPATIENT)
Dept: RESEARCH | Facility: HOSPITAL | Age: 61
End: 2021-04-16

## 2022-10-08 ENCOUNTER — OFFICE VISIT (OUTPATIENT)
Dept: URGENT CARE | Facility: CLINIC | Age: 62
End: 2022-10-08
Payer: OTHER MISCELLANEOUS

## 2022-10-08 VITALS
OXYGEN SATURATION: 95 % | DIASTOLIC BLOOD PRESSURE: 75 MMHG | HEIGHT: 66 IN | BODY MASS INDEX: 46.06 KG/M2 | SYSTOLIC BLOOD PRESSURE: 154 MMHG | TEMPERATURE: 98 F | HEART RATE: 76 BPM | RESPIRATION RATE: 18 BRPM | WEIGHT: 286.63 LBS

## 2022-10-08 DIAGNOSIS — Z02.6 ENCOUNTER RELATED TO WORKER'S COMPENSATION CLAIM: Primary | ICD-10-CM
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
## 2022-10-08 DIAGNOSIS — S63.501A SPRAIN OF RIGHT WRIST, INITIAL ENCOUNTER: ICD-10-CM

## 2022-10-08 DIAGNOSIS — S80.02XA CONTUSION OF LEFT KNEE, INITIAL ENCOUNTER: ICD-10-CM

## 2022-10-08 DIAGNOSIS — M25.531 RIGHT WRIST PAIN: ICD-10-CM

## 2022-10-08 PROCEDURE — 73110 X-RAY EXAM OF WRIST: CPT | Mod: FY,RT,S$GLB, | Performed by: RADIOLOGY

## 2022-10-08 PROCEDURE — 73564 X-RAY EXAM KNEE 4 OR MORE: CPT | Mod: FY,LT,S$GLB, | Performed by: RADIOLOGY

## 2022-10-08 PROCEDURE — 99203 OFFICE O/P NEW LOW 30 MIN: CPT | Mod: S$GLB,,, | Performed by: PHYSICIAN ASSISTANT

## 2022-10-08 PROCEDURE — 73130 X-RAY EXAM OF HAND: CPT | Mod: FY,RT,S$GLB, | Performed by: RADIOLOGY

## 2022-10-08 PROCEDURE — 99203 PR OFFICE/OUTPT VISIT, NEW, LEVL III, 30-44 MIN: ICD-10-PCS | Mod: S$GLB,,, | Performed by: PHYSICIAN ASSISTANT

## 2022-10-08 PROCEDURE — 73564 XR KNEE COMP 4 OR MORE VIEWS LEFT: ICD-10-PCS | Mod: FY,LT,S$GLB, | Performed by: RADIOLOGY

## 2022-10-08 PROCEDURE — 73110 XR WRIST COMPLETE 3 VIEWS RIGHT: ICD-10-PCS | Mod: FY,RT,S$GLB, | Performed by: RADIOLOGY

## 2022-10-08 PROCEDURE — 73130 XR HAND COMPLETE 3 VIEW RIGHT: ICD-10-PCS | Mod: FY,RT,S$GLB, | Performed by: RADIOLOGY

## 2022-10-08 NOTE — PATIENT INSTRUCTIONS
PLEASE READ YOUR DISCHARGE INSTRUCTIONS ENTIRELY AS IT CONTAINS IMPORTANT INFORMATION.  - OTC Tylenol/anti-inflammatory every 6-8 hours with food as needed for pain.  - continue heat/ice compression, rice therapy, and muscle stretches.  Continue wrist brace as needed.    - You need to evaluated by Occupational medicine to determine your return to work status.     - if no improvement or worsening symptoms, recommend follow-up with *Occupational medicine for further evaluation and for work related injury.  Please call the number below to set up appointment; a referral has been placed. Or you can choose another location on form given.     [facility]  [address] [place] [address]  Please call [phone] OR [phone]    -You must understand that you've received an Urgent Care treatment only and that you may be released before all your medical problems are known or treated. You, the patient, will arrange for follow up care.    - If your condition worsens or fails to improve we recommend that you receive another evaluation at the emergency room immediately. Strict ER versus clinic precautions given.      RED FLAGS/WARNING SYMPTOMS DISCUSSED WITH PATIENT THAT WOULD WARRANT EMERGENT MEDICAL ATTENTION. Patient aware and verbalized understanding.

## 2022-10-08 NOTE — PROGRESS NOTES
"Subjective:       Patient ID: Gaby Ennis is a 62 y.o. female.    Vitals:  height is 5' 6" (1.676 m) and weight is 130 kg (286 lb 9.6 oz). Her oral temperature is 98.3 °F (36.8 °C). Her blood pressure is 154/75 (abnormal) and her pulse is 76. Her respiration is 18 and oxygen saturation is 95%.     Chief Complaint: Injury    62-YEAR-OLD FEMALE PRESENTS URGENT CARE CLINIC FOR EVALUATION.  Patient states that she in injured herself yesterday at work around 4:30 p.m.. Patient tripped over the table leg and fell on her left knee and right wrist/hand.  Left knee pain has significantly improved.  Right wrist and hand pain is the worst.  There is significant bruising and swelling present.  Improved compared to yesterday with wear a soft wrist brace.  Patient tried taking Ibuprofen and had some relief.  No open wound, loss of sensation, or focal weakness/deficits.    Injury  This is a new problem. The current episode started yesterday. The problem occurs constantly. The problem has been gradually improving. Associated symptoms include arthralgias and joint swelling. Pertinent negatives include no abdominal pain, chest pain, chills, congestion, coughing, diaphoresis, fatigue, fever, headaches, myalgias, nausea, neck pain, numbness, rash, sore throat, vertigo, vomiting or weakness. The symptoms are aggravated by twisting, walking and bending. She has tried NSAIDs and ice for the symptoms. The treatment provided mild relief.     Constitution: Negative for activity change, appetite change, chills, sweating, fatigue, fever and generalized weakness.   HENT:  Negative for ear pain, hearing loss, facial swelling, congestion, postnasal drip, sinus pain, sinus pressure, sore throat, trouble swallowing and voice change.    Neck: Negative for neck pain, neck stiffness and painful lymph nodes.   Cardiovascular:  Negative for chest pain, leg swelling, palpitations, sob on exertion and passing out.   Eyes:  Negative for eye " discharge, eye pain, photophobia, vision loss, double vision and blurred vision.   Respiratory:  Negative for chest tightness, cough, sputum production, bloody sputum, COPD, shortness of breath, stridor, wheezing and asthma.    Gastrointestinal:  Negative for abdominal pain, nausea, vomiting, constipation, diarrhea, bright red blood in stool, rectal bleeding, heartburn and bowel incontinence.   Genitourinary:  Negative for dysuria, frequency, urgency, urine decreased, flank pain, bladder incontinence and hematuria.   Musculoskeletal:  Positive for trauma, joint pain and joint swelling. Negative for abnormal ROM of joint, muscle cramps and muscle ache.   Skin:  Positive for bruising. Negative for color change, pale, rash and wound.   Allergic/Immunologic: Negative for seasonal allergies, asthma and immunocompromised state.   Neurological:  Negative for dizziness, history of vertigo, light-headedness, passing out, facial drooping, speech difficulty, coordination disturbances, loss of balance, headaches, disorientation, altered mental status, loss of consciousness, numbness, tingling and seizures.   Hematologic/Lymphatic: Negative for swollen lymph nodes, easy bruising/bleeding and trouble clotting. Does not bruise/bleed easily.   Psychiatric/Behavioral:  Negative for altered mental status and disorientation.            Objective:      Physical Exam   Constitutional: She appears well-developed. She is cooperative.  Non-toxic appearance. She does not appear ill. No distress.   HENT:   Head: Normocephalic and atraumatic.   Ears:   Right Ear: Hearing, external ear and ear canal normal.   Left Ear: Hearing, external ear and ear canal normal.   Nose: Nose normal.   Mouth/Throat: Uvula is midline, oropharynx is clear and moist and mucous membranes are normal. Mucous membranes are moist. No posterior oropharyngeal edema. No tonsillar exudate. Oropharynx is clear.   Eyes: Conjunctivae, EOM and lids are normal. Pupils are  equal, round, and reactive to light. Right eye exhibits no discharge. Left eye exhibits no discharge. Extraocular movement intact   Neck: Neck supple. No neck rigidity present.   Cardiovascular: Normal rate, regular rhythm and normal pulses.   Pulmonary/Chest: Effort normal. No accessory muscle usage. No respiratory distress. She has no wheezes. She exhibits no tenderness.   Abdominal: Normal appearance. She exhibits no distension. Soft. There is no abdominal tenderness.   Musculoskeletal: Normal range of motion.         General: Swelling, tenderness and signs of injury present. Normal range of motion.      Right lower leg: No edema.      Left lower leg: No edema.      Comments: Moves all extremities with normal tone, strength, and ROM.    Slightly antalgic gait on left knee.  Superficial ecchymosis on left anterior knee.  Full strength and ROM with left knee with no laxity of joint with anterior maneuver, valgus, and varus maneuvers.    Right hand and wrists with edema, superficial ecchymosis, and tenderness to palpation that is generalized.  In soft wrist brace from home.   Neurological: no focal deficit. She is alert and at baseline. She has normal motor skills and normal sensation. She displays no weakness, facial symmetry and normal reflexes. No cranial nerve deficit or sensory deficit. She exhibits normal muscle tone. Gait and coordination normal. Coordination normal.   Skin: Skin is warm, dry, not diaphoretic and no rash. Capillary refill takes less than 2 seconds.   Psychiatric: Her behavior is normal. Mood and thought content normal.   Nursing note and vitals reviewed.      XR WRIST COMPLETE 3 VIEWS RIGHT    Result Date: 10/8/2022  EXAMINATION: XR WRIST COMPLETE 3 VIEWS RIGHT; XR HAND COMPLETE 3 VIEW RIGHT CLINICAL HISTORY: Unspecified fall, initial encounter TECHNIQUE: PA, lateral, and oblique views of the right wrist and right hand were performed. COMPARISON: Right hand series 07/10/2020 FINDINGS: Overall  alignment is within normal limits.  Similar tiny ossific density adjacent to the ulnar styloid tip without donor site or significant overlying soft tissue swelling likely accessory ossicle or sequela of remote trauma.  Subtle radiolucency with apparent cortical step-off projected over the triquetrum on the oblique views.  No dislocation or destructive osseous process.  Baseline minimal DJD about the wrist.  No subcutaneous emphysema or radiodense retained foreign body.     Questionable nondisplaced fracture of the triquetrum versus artifact from overlapping osseous interfaces.  Correlate for point tenderness at this region. Electronically signed by: Santos Johnston MD Date:    10/08/2022 Time:    13:25    XR HAND COMPLETE 3 VIEW RIGHT    Result Date: 10/8/2022  EXAMINATION: XR WRIST COMPLETE 3 VIEWS RIGHT; XR HAND COMPLETE 3 VIEW RIGHT CLINICAL HISTORY: Unspecified fall, initial encounter TECHNIQUE: PA, lateral, and oblique views of the right wrist and right hand were performed. COMPARISON: Right hand series 07/10/2020 FINDINGS: Overall alignment is within normal limits.  Similar tiny ossific density adjacent to the ulnar styloid tip without donor site or significant overlying soft tissue swelling likely accessory ossicle or sequela of remote trauma.  Subtle radiolucency with apparent cortical step-off projected over the triquetrum on the oblique views.  No dislocation or destructive osseous process.  Baseline minimal DJD about the wrist.  No subcutaneous emphysema or radiodense retained foreign body.     Questionable nondisplaced fracture of the triquetrum versus artifact from overlapping osseous interfaces.  Correlate for point tenderness at this region. Electronically signed by: Santos Johnston MD Date:    10/08/2022 Time:    13:25    X-Ray Knee Complete 4 or More Views Left    Result Date: 10/8/2022  EXAMINATION: XR KNEE COMP 4 OR MORE VIEWS LEFT CLINICAL HISTORY: Unspecified fall, initial encounter TECHNIQUE: Three  views COMPARISON: None FINDINGS: Overall alignment is within normal limits.  No displaced fracture, dislocation or destructive osseous process.  No large suprapatellar joint effusion.  Overall mild to moderate tricompartmental degenerative change.  No subcutaneous emphysema or radiodense retained foreign body.  Enthesophyte at the superior patellar pole.     No acute displaced fracture-dislocation identified. Electronically signed by: Santos Johnston MD Date:    10/08/2022 Time:    13:17     Assessment:       1. Encounter related to worker's compensation claim    2. Fall, initial encounter    3. Contusion of left knee, initial encounter    4. Sprain of right wrist, initial encounter    5. Right wrist pain        62 y.o. female who presents with work related injury. Neurovascularly intact. Xrays done which did not show any knee fracture or dislocation.  Right wrist and hand x-ray with questionable nondisplaced fracture of the triquetrum.  Discussed RICE therapy and OTC pain med prn.  Discussed Orthoglass splinting versus hard wrist brace.  Patient opted for hard wrist brace.  Patient will follow up with Occupational Medicine Monday for work related injury.     Patient needs to be evaluated by Occupational medicine to determine return to work status.      Return Appointment:  Please call to schedule or present around 8am-4pm for evaluation at [facility] [place] [address]    Please call [phone] or [phone]         Plan:         Encounter related to worker's compensation claim  -     Non-DOT Drug Screen  -     XR WRIST COMPLETE 3 VIEWS RIGHT; Future; Expected date: 10/08/2022  -     XR HAND COMPLETE 3 VIEW RIGHT; Future; Expected date: 10/08/2022  -     X-Ray Knee Complete 4 or More Views Left; Future; Expected date: 10/08/2022  -     Ambulatory referral/consult to Occupational Medicine    Fall, initial encounter  -     XR WRIST COMPLETE 3 VIEWS RIGHT; Future;  Expected date: 10/08/2022  -     XR HAND COMPLETE 3 VIEW RIGHT; Future; Expected date: 10/08/2022  -     X-Ray Knee Complete 4 or More Views Left; Future; Expected date: 10/08/2022  -     Ambulatory referral/consult to Occupational Medicine    Contusion of left knee, initial encounter  -     X-Ray Knee Complete 4 or More Views Left; Future; Expected date: 10/08/2022  -     Ambulatory referral/consult to Occupational Medicine    Sprain of right wrist, initial encounter  -     XR WRIST COMPLETE 3 VIEWS RIGHT; Future; Expected date: 10/08/2022  -     XR HAND COMPLETE 3 VIEW RIGHT; Future; Expected date: 10/08/2022  -     Ambulatory referral/consult to Occupational Medicine    Right wrist pain  -     WRIST BRACE FOR HOME USE            Additional MDM:     Heart Failure Score:   COPD = No

## 2022-10-08 NOTE — LETTER
[facility]  [address]  [place] [address]  Phone: [phone]  Fax: [phone]  [facility]: [phone]    Pt Name: Gaby Ennis  Injury Date: 10/07/2022   Employee ID: [id number] Date of First Treatment: 10/08/2022   Company: [employer] reference to record [id number]      Appointment Time: 09:15 AM Arrived: 10:56A   Provider: David Fernandes PA-C Time Out:3PM     Office Treatment:   1. Encounter related to worker's compensation claim    2. Fall, initial encounter    3. Contusion of left knee, initial encounter    4. Sprain of right wrist, initial encounter                       Return Appointment:  Please call to schedule or present around 8am-4pm for evaluation at [facility] at [address] [address]    Please call [phone] or [phone]

## 2022-10-10 ENCOUNTER — TELEPHONE (OUTPATIENT)
Dept: URGENT CARE | Facility: CLINIC | Age: 62
End: 2022-10-10
Payer: COMMERCIAL

## 2022-10-13 ENCOUNTER — OFFICE VISIT (OUTPATIENT)
Dept: URGENT CARE | Facility: CLINIC | Age: 62
End: 2022-10-13
Payer: OTHER MISCELLANEOUS

## 2022-10-13 VITALS
HEIGHT: 66 IN | TEMPERATURE: 98 F | DIASTOLIC BLOOD PRESSURE: 81 MMHG | BODY MASS INDEX: 45.96 KG/M2 | WEIGHT: 286 LBS | HEART RATE: 73 BPM | SYSTOLIC BLOOD PRESSURE: 141 MMHG

## 2022-10-13 DIAGNOSIS — S80.02XA CONTUSION OF LEFT KNEE, INITIAL ENCOUNTER: ICD-10-CM

## 2022-10-13 DIAGNOSIS — Z02.6 ENCOUNTER RELATED TO WORKER'S COMPENSATION CLAIM: Primary | ICD-10-CM

## 2022-10-13 DIAGNOSIS — S63.501A SPRAIN OF RIGHT WRIST, INITIAL ENCOUNTER: ICD-10-CM

## 2022-10-13 DIAGNOSIS — S60.211A CONTUSION OF RIGHT WRIST, INITIAL ENCOUNTER: ICD-10-CM

## 2022-10-13 DIAGNOSIS — S62.101A FRACTURE OF UNSPECIFIED CARPAL BONE, RIGHT WRIST, INITIAL ENCOUNTER FOR CLOSED FRACTURE: ICD-10-CM

## 2022-10-13 PROCEDURE — 99214 OFFICE O/P EST MOD 30 MIN: CPT | Mod: S$GLB,,,

## 2022-10-13 PROCEDURE — 73110 X-RAY EXAM OF WRIST: CPT | Mod: RT,S$GLB,, | Performed by: RADIOLOGY

## 2022-10-13 PROCEDURE — 99214 PR OFFICE/OUTPT VISIT, EST, LEVL IV, 30-39 MIN: ICD-10-PCS | Mod: S$GLB,,,

## 2022-10-13 PROCEDURE — 73110 XR WRIST COMPLETE 3 VIEWS RIGHT: ICD-10-PCS | Mod: RT,S$GLB,, | Performed by: RADIOLOGY

## 2022-10-13 NOTE — PROGRESS NOTES
Subjective:       Patient ID: Gaby Ennis is a 62 y.o. female.    Chief Complaint: Wrist Injury (Right)    See MA note.  She denies any symptoms to her left knee other than the resolving bruising.  She continues to have pain and limited mobility of the right wrist even though swelling has gone down considerably.  She has difficulty lifting with her right hand or performing ADLs such as trying to brush her hair.  Previously she had been provided a thumb spica wrist brace that was too large and difficult to use special when driving.  Therefore she has been using a soft brace in the interim.    New Injury Right Wrist (DOI 10/7/22). 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 She is here to follow up from an . She tripped over a table and fell on her left knee and right wrist/hand. She states that her right wrist and hand still hurts. She states that the swelling has gone down some. She states that over the weekend the pain was much worse. There was bruising and swelling present. She continues to wear a soft wrist brace. Her pain is 2/10.    EC          Wrist Injury     Constitution: Positive for activity change.   HENT: Negative.     Neck: neck negative.   Cardiovascular: Negative.    Eyes: Negative.    Respiratory: Negative.     Gastrointestinal: Negative.    Endocrine: negative.   Genitourinary: Negative.    Musculoskeletal:  Positive for pain, joint pain, joint swelling, abnormal ROM of joint and muscle ache.   Skin:  Positive for bruising. Negative for color change.   Neurological:  Positive for tingling.      Objective:      Physical Exam  Vitals and nursing note reviewed.   Constitutional:       General: She is not in acute distress.  HENT:      Head: Normocephalic.   Eyes:      General: Lids are normal.      Conjunctiva/sclera: Conjunctivae normal.   Musculoskeletal:      Right wrist: Swelling, tenderness and bony tenderness present. No deformity. Decreased range of motion. Normal pulse.      Right hand:  Normal range of motion.        Hands:       Cervical back: Normal range of motion.      Left knee: No swelling or deformity. Normal range of motion.      Comments: Mild swelling and resolving ecchymosis noted to the right wrist.  Tenderness on palpation across the distal ulna and radius area.  Decreased right wrist range of motion in all planes.  Normal left knee range of motion with resolving ecchymosis noted.   Skin:     General: Skin is warm.      Capillary Refill: Capillary refill takes less than 2 seconds.      Findings: Bruising present.   Neurological:      General: No focal deficit present.      Mental Status: She is alert and oriented to person, place, and time.   Psychiatric:         Attention and Perception: Attention normal.         Mood and Affect: Mood and affect normal.         Speech: Speech normal.         Behavior: Behavior normal. Behavior is cooperative.       X-Ray Wrist Complete Right    Result Date: 10/13/2022  EXAMINATION: XR WRIST COMPLETE 3 VIEWS RIGHT CLINICAL HISTORY: Contusion of right wrist, initial encounter TECHNIQUE: PA, lateral, and oblique views of the right wrist were performed. COMPARISON: 10/08/2022 FINDINGS: Bones are well mineralized.  Tiny ossific density again seen adjacent to the ulnar styloid.  Once again, this likely represents an accessory ossicle or old trauma.  Irregularity of the cortex of the triquetrum is again noted on the oblique view.  Findings are again concerning for a recent, nondisplaced fracture.  Correlate with mechanism of injury and precise location of pain.  No other evidence of fracture or dislocation is seen.  No definite soft tissue abnormality appreciated.     Irregularity of the cortex of the triquetrum is again seen and remains concerning for a possible recent nondisplaced fracture.  Correlate with mechanism of injury and precise location of pain. This report was flagged in Epic as abnormal. Electronically signed by: Gus Tapia MD  Date:    10/13/2022 Time:    13:08      Assessment:       1. Encounter related to worker's compensation claim    2. Sprain of right wrist, initial encounter    3. Contusion of left knee, initial encounter    4. Contusion of right wrist, initial encounter    5. Fracture of unspecified carpal bone, right wrist, initial encounter for closed fracture          Plan:       Based on her account it sounds as though the thumb spica wrist brace is too large.  I will provide her with a simple brace to help secure her right wrist.  Referral to Hand Ortho for further evaluation of fracture will be placed.  Will have a follow-up in 2 weeks time to insure timely follow-up.  She may use over-the-counter ibuprofen and Tylenol as directed on label.     Patient Instructions: Referral to specialist to be scheduled, once authorized   Restrictions:  (No use of right hand or wrist.  Must wear brace)  Follow up in about 2 weeks (around 10/27/2022).

## 2022-10-13 NOTE — LETTER
[facility]  [address] [address]  Phone: [phone]  Fax: [phone]  [facility]: [email or web address]    Pt Name: Gaby Ennis  Injury Date: 10/07/2022   Employee ID: [id number] Date of First Treatment: 10/13/2022   Company: [employer] to [employer]      Appointment Time: 11:15 AM Arrived: 11:30am   Provider: [place], DO Time Out:2:00pm     Office Treatment:   1. Encounter related to worker's compensation claim    2. Sprain of right wrist, initial encounter    3. Contusion of left knee, initial encounter    4. Contusion of right wrist, initial encounter    5. Fracture of unspecified carpal bone, right wrist, initial encounter for closed fracture          Patient Instructions: Referral to specialist to be scheduled, once authorized      Restrictions:  (No use of right hand or wrist.  Must wear brace)     Return Appointment: 10/27/2022 at 3:00pm.   EC

## 2023-08-10 ENCOUNTER — OCCUPATIONAL HEALTH (OUTPATIENT)
Dept: URGENT CARE | Facility: CLINIC | Age: 63
End: 2023-08-10

## 2023-08-10 DIAGNOSIS — Z00.00 ENCOUNTER FOR PHYSICAL EXAMINATION: Primary | ICD-10-CM

## 2023-08-10 LAB — BREATH ALCOHOL: 0

## 2023-08-10 PROCEDURE — 82075 ASSAY OF BREATH ETHANOL: CPT | Mod: S$GLB,,, | Performed by: NURSE PRACTITIONER

## 2023-08-10 PROCEDURE — 82075 POCT BREATH ALCOHOL TEST: ICD-10-PCS | Mod: S$GLB,,, | Performed by: NURSE PRACTITIONER

## 2023-08-10 PROCEDURE — 80305 OOH COLLECTION ONLY DRUG SCREEN: ICD-10-PCS | Mod: S$GLB,,, | Performed by: NURSE PRACTITIONER

## 2023-08-10 PROCEDURE — 80305 DRUG TEST PRSMV DIR OPT OBS: CPT | Mod: S$GLB,,, | Performed by: NURSE PRACTITIONER

## 2023-08-10 PROCEDURE — 99499 PHYSICAL, BASIC COMPLEXITY: ICD-10-PCS | Mod: S$GLB,,, | Performed by: NURSE PRACTITIONER

## 2023-08-10 PROCEDURE — 99499 UNLISTED E&M SERVICE: CPT | Mod: S$GLB,,, | Performed by: NURSE PRACTITIONER

## 2023-11-09 ENCOUNTER — CLINICAL SUPPORT (OUTPATIENT)
Dept: OTHER | Facility: CLINIC | Age: 63
End: 2023-11-09
Payer: COMMERCIAL

## 2023-11-09 DIAGNOSIS — Z00.8 ENCOUNTER FOR OTHER GENERAL EXAMINATION: ICD-10-CM

## 2023-11-10 ENCOUNTER — LAB VISIT (OUTPATIENT)
Dept: LAB | Facility: HOSPITAL | Age: 63
End: 2023-11-10
Attending: OTOLARYNGOLOGY
Payer: COMMERCIAL

## 2023-11-10 ENCOUNTER — OFFICE VISIT (OUTPATIENT)
Dept: OTOLARYNGOLOGY | Facility: CLINIC | Age: 63
End: 2023-11-10
Payer: COMMERCIAL

## 2023-11-10 VITALS
BODY MASS INDEX: 46.95 KG/M2 | DIASTOLIC BLOOD PRESSURE: 73 MMHG | SYSTOLIC BLOOD PRESSURE: 130 MMHG | HEART RATE: 73 BPM | WEIGHT: 287.13 LBS | HEIGHT: 64 IN | SYSTOLIC BLOOD PRESSURE: 135 MMHG | WEIGHT: 275 LBS | DIASTOLIC BLOOD PRESSURE: 84 MMHG | BODY MASS INDEX: 49.29 KG/M2

## 2023-11-10 DIAGNOSIS — J32.8 OTHER CHRONIC SINUSITIS: Primary | Chronic | ICD-10-CM

## 2023-11-10 DIAGNOSIS — J34.3 HYPERTROPHY OF INFERIOR NASAL TURBINATE: Chronic | ICD-10-CM

## 2023-11-10 DIAGNOSIS — J30.9 CHRONIC ALLERGIC RHINITIS: Chronic | ICD-10-CM

## 2023-11-10 LAB
GLUCOSE SERPL-MCNC: 126 MG/DL (ref 60–140)
HDLC SERPL-MCNC: 39 MG/DL
POC CHOLESTEROL, LDL (DOCK): 159 MG/DL
POC CHOLESTEROL, TOTAL: 240 MG/DL
TRIGL SERPL-MCNC: 229 MG/DL

## 2023-11-10 PROCEDURE — 3078F PR MOST RECENT DIASTOLIC BLOOD PRESSURE < 80 MM HG: ICD-10-PCS | Mod: CPTII,S$GLB,, | Performed by: OTOLARYNGOLOGY

## 2023-11-10 PROCEDURE — 1160F RVW MEDS BY RX/DR IN RCRD: CPT | Mod: CPTII,S$GLB,, | Performed by: OTOLARYNGOLOGY

## 2023-11-10 PROCEDURE — 86003 ALLG SPEC IGE CRUDE XTRC EA: CPT | Performed by: OTOLARYNGOLOGY

## 2023-11-10 PROCEDURE — 99999 PR PBB SHADOW E&M-EST. PATIENT-LVL IV: ICD-10-PCS | Mod: PBBFAC,,, | Performed by: OTOLARYNGOLOGY

## 2023-11-10 PROCEDURE — 1159F MED LIST DOCD IN RCRD: CPT | Mod: CPTII,S$GLB,, | Performed by: OTOLARYNGOLOGY

## 2023-11-10 PROCEDURE — 3075F SYST BP GE 130 - 139MM HG: CPT | Mod: CPTII,S$GLB,, | Performed by: OTOLARYNGOLOGY

## 2023-11-10 PROCEDURE — 99999 PR PBB SHADOW E&M-EST. PATIENT-LVL IV: CPT | Mod: PBBFAC,,, | Performed by: OTOLARYNGOLOGY

## 2023-11-10 PROCEDURE — 3008F PR BODY MASS INDEX (BMI) DOCUMENTED: ICD-10-PCS | Mod: CPTII,S$GLB,, | Performed by: OTOLARYNGOLOGY

## 2023-11-10 PROCEDURE — 1160F PR REVIEW ALL MEDS BY PRESCRIBER/CLIN PHARMACIST DOCUMENTED: ICD-10-PCS | Mod: CPTII,S$GLB,, | Performed by: OTOLARYNGOLOGY

## 2023-11-10 PROCEDURE — 86003 ALLG SPEC IGE CRUDE XTRC EA: CPT | Mod: 59 | Performed by: OTOLARYNGOLOGY

## 2023-11-10 PROCEDURE — 3008F BODY MASS INDEX DOCD: CPT | Mod: CPTII,S$GLB,, | Performed by: OTOLARYNGOLOGY

## 2023-11-10 PROCEDURE — 99204 PR OFFICE/OUTPT VISIT, NEW, LEVL IV, 45-59 MIN: ICD-10-PCS | Mod: S$GLB,,, | Performed by: OTOLARYNGOLOGY

## 2023-11-10 PROCEDURE — 36415 COLL VENOUS BLD VENIPUNCTURE: CPT | Performed by: OTOLARYNGOLOGY

## 2023-11-10 PROCEDURE — 3078F DIAST BP <80 MM HG: CPT | Mod: CPTII,S$GLB,, | Performed by: OTOLARYNGOLOGY

## 2023-11-10 PROCEDURE — 1159F PR MEDICATION LIST DOCUMENTED IN MEDICAL RECORD: ICD-10-PCS | Mod: CPTII,S$GLB,, | Performed by: OTOLARYNGOLOGY

## 2023-11-10 PROCEDURE — 99204 OFFICE O/P NEW MOD 45 MIN: CPT | Mod: S$GLB,,, | Performed by: OTOLARYNGOLOGY

## 2023-11-10 PROCEDURE — 3075F PR MOST RECENT SYSTOLIC BLOOD PRESS GE 130-139MM HG: ICD-10-PCS | Mod: CPTII,S$GLB,, | Performed by: OTOLARYNGOLOGY

## 2023-11-10 RX ORDER — METHYLPREDNISOLONE 4 MG/1
TABLET ORAL
Qty: 1 EACH | Refills: 0 | Status: SHIPPED | OUTPATIENT
Start: 2023-11-10

## 2023-11-10 RX ORDER — CLARITHROMYCIN 500 MG/1
500 TABLET, FILM COATED ORAL EVERY 12 HOURS
Qty: 20 TABLET | Refills: 0 | Status: SHIPPED | OUTPATIENT
Start: 2023-11-10 | End: 2023-11-20

## 2023-11-10 NOTE — PROGRESS NOTES
Chief Complaint   Patient presents with    Sinus Problem   .    HPI:     Gaby Ennis is a 63 y.o. female who presents for evaluation of a several month history of nasal congestion and postnasal drip.  She notes that 6 weeks ago she had URI symptoms with cough. She took allegra and some prednisone that she had on hand.  She reports that the symptoms have not improved. She describes difficulty breathing at night. There is bilateral nasal obstruction. She does use sinus rinses or nasal sprays. She admits to midface pain and pressure. She does report headaches.  She has had sinus or nasal surgery. She reports history of septoplasty in 1990s. There is no history of sinonasal trauma. Reports severe childhood allergies.           History reviewed. No pertinent past medical history.  Social History     Socioeconomic History    Marital status: Single   Tobacco Use    Smoking status: Never    Smokeless tobacco: Never   Substance and Sexual Activity    Alcohol use: Not Currently     History reviewed. No pertinent surgical history.  Family History   Problem Relation Age of Onset    Hyperlipidemia Mother     Heart attack Father     Heart disease Father     Hypertension Father     Hyperlipidemia Brother     Stroke Maternal Grandfather     Heart failure Neg Hx            Review of Systems  General: negative for chills, fever or weight loss  Psychological: negative for mood changes or depression  Ophthalmic: negative for blurry vision, photophobia or eye pain  ENT: see HPI  Respiratory: no cough, shortness of breath, or wheezing  Cardiovascular: no chest pain or dyspnea on exertion  Gastrointestinal: no abdominal pain, change in bowel habits, or black/ bloody stools  Musculoskeletal: negative for gait disturbance or muscular weakness  Neurological: no syncope or seizures; no ataxia  Dermatological: negative for puritis,  rash and jaundice  Hematologic/lymphatic: no easy bruising, no new lumps or bumps      Physical  Exam:    Vitals:    11/10/23 0757   BP: 135/73   Pulse: 73       Constitutional: Well appearing / communicating without difficutly.  NAD.  Eyes: EOM I Bilaterally  Head/Face: Normocephalic.  Negative paranasal sinus pressure/tenderness.  Salivary glands WNL.  House Brackmann I Bilaterally.    Right Ear: Auricle normal appearance. External Auditory Canal within normal limits,TM w/o masses/lesions/perforations. TM mobility noted.   Left Ear: Auricle normal appearance. External Auditory Canal WNL,TM w/o masses/lesions/perforations. TM mobility noted.  Nose: No gross nasal septal deviation. Inferior Turbinates 3+ bilaterally. No septal perforation. No masses/lesions. External nasal skin appears normal without masses/lesions.  Oral Cavity: Gingiva/lips within normal limits.  Dentition/gingiva healthy appearing. Mucus membranes moist. Floor of mouth soft, no masses palpated. Oral Tongue mobile. Hard Palate appears normal.    Oropharynx: Base of tongue appears normal. No masses/lesions noted. Tonsillar fossa/pharyngeal wall without lesions. Posterior oropharynx WNL.  Soft palate without masses. Midline uvula.   Neck/Lymphatic: No LAD I-VI bilaterally.  No thyromegaly.  No masses noted on exam.    Mirror laryngoscopy/nasopharyngoscopy: Active gag reflex.  Unable to perform.              Assessment:    ICD-10-CM ICD-9-CM    1. Other chronic sinusitis  J32.8 473.8       2. Chronic allergic rhinitis  J30.9 477.9 Aspergillus fumagatus IgE      Bermuda grass IgE      Cat epithelium IgE      Cladosporium IgE      Cockroach, American IgE      [place], bald IgE      D. farinae IgE      D. pteronyssinus IgE      Dog dander IgE      Plantain, English IgE      [place] grass IgE      Marsh elder, rough IgE      Nettle IgE      Oak, white IgE      Penicillium IgE      Ragweed, short, common IgE      Madhu IgE      Allergen, Elm [place]      Allergen, Meadow Grass ([place] Blue)      Allergen, Mucor Racemosus      Allergen, Pecan  Hickory IgE      Allergen-Alternaria Alternata      Allergen-[place]      Allergen-Common Pigweed      Allergen-Silver Birch      RAST Allergen for [place]      RAST Allergen Maple ([place])      RAST Allergen [employer]      3. Hypertrophy of inferior nasal turbinate  J34.3 478.0         The primary encounter diagnosis was Other chronic sinusitis. Diagnoses of Chronic allergic rhinitis and Hypertrophy of inferior nasal turbinate were also pertinent to this visit.      Plan:  Orders Placed This Encounter   Procedures    Aspergillus fumagatus IgE    Bermuda grass IgE    Cat epithelium IgE    Cladosporium IgE    Cockroach, American IgE    [place], bald IgE    D. farinae IgE    D. pteronyssinus IgE    Dog dander IgE    Plantain, English IgE    B[place]eto grass IgE    Marsh elder, rough IgE    Nettle IgE    Oak, white IgE    Penicillium IgE    Ragweed, short, common IgE    Madhu IgE    Allergen, Elm [place]    Allergen, Meadow Grass ([place] Blue)    Allergen, Mucor Racemosus    Allergen, Pecan [place] IgE    Allergen-Alternaria Alternata    Allergen-[place]    Allergen-Common Pigweed    Allergen-Silver Birch    RAST Allergen for [place]    RAST Allergen Maple ([place])    RAST Allergen [employer]     Recommend nasal saline rinses BID  Recommend Flonase 2 sprays per nostril BID  Continue Allegra daily  Start Biaxin 500 mg PO BID  Start medrol dose dipak  Obtain immunoCAP testing  Follow up in 6-8 weeks for recheck; consider nasal endoscopy if not improved.     Rachael Mei MD

## 2023-11-13 LAB
A ALTERNATA IGE QN: <0.1 KU/L
A FUMIGATUS IGE QN: <0.1 KU/L
ALLERGEN BOXELDER MAPLE TREE IGE: <0.1 KU/L
ALLERGEN MAPLE (BOX ELDER) CLASS: NORMAL
ALLERGEN MULBERRY CLASS: NORMAL
ALLERGEN MULBERRY TREE IGE: <0.1 KU/L
ALLERGEN PIGWEED IGE: <0.1 KU/L
BALD CYPRESS IGE QN: <0.1 KU/L
BERMUDA GRASS IGE QN: <0.1 KU/L
C HERBARUM IGE QN: <0.1 KU/L
CAT DANDER IGE QN: 0.73 KU/L
CEDAR IGE QN: <0.1 KU/L
COMMON PIGWEED CLASS: NORMAL
COMMON RAGWEED IGE QN: <0.1 KU/L
D FARINAE IGE QN: 0.27 KU/L
D PTERONYSS IGE QN: 0.37 KU/L
DEPRECATED A ALTERNATA IGE RAST QL: NORMAL
DEPRECATED A FUMIGATUS IGE RAST QL: NORMAL
DEPRECATED BALD CYPRESS IGE RAST QL: NORMAL
DEPRECATED BERMUDA GRASS IGE RAST QL: NORMAL
DEPRECATED C HERBARUM IGE RAST QL: NORMAL
DEPRECATED CAT DANDER IGE RAST QL: ABNORMAL
DEPRECATED CEDAR IGE RAST QL: NORMAL
DEPRECATED COMMON RAGWEED IGE RAST QL: NORMAL
DEPRECATED D FARINAE IGE RAST QL: ABNORMAL
DEPRECATED D PTERONYSS IGE RAST QL: ABNORMAL
DEPRECATED DOG DANDER IGE RAST QL: NORMAL
DEPRECATED ELDER IGE RAST QL: NORMAL
DEPRECATED ENGL PLANTAIN IGE RAST QL: NORMAL
DEPRECATED JOHNSON GRASS IGE RAST QL: NORMAL
DEPRECATED KENT BLUE GRASS IGE RAST QL: NORMAL
DEPRECATED M RACEMOSUS IGE RAST QL: NORMAL
DEPRECATED NETTLE IGE RAST QL: NORMAL
DEPRECATED P NOTATUM IGE RAST QL: NORMAL
DEPRECATED PECAN/HICK TREE IGE RAST QL: NORMAL
DEPRECATED ROACH IGE RAST QL: NORMAL
DEPRECATED SILVER BIRCH IGE RAST QL: NORMAL
DEPRECATED TIMOTHY IGE RAST QL: NORMAL
DEPRECATED WHITE OAK IGE RAST QL: NORMAL
DOG DANDER IGE QN: <0.1 KU/L
ELDER IGE QN: <0.1 KU/L
ELM CEDAR CLASS: NORMAL
ELM CEDAR, IGE: <0.1 KU/L
ENGL PLANTAIN IGE QN: <0.1 KU/L
JOHNSON GRASS IGE QN: <0.1 KU/L
KENT BLUE GRASS IGE QN: <0.1 KU/L
M RACEMOSUS IGE QN: <0.1 KU/L
NETTLE IGE QN: <0.1 KU/L
P NOTATUM IGE QN: <0.1 KU/L
PECAN/HICK TREE IGE QN: <0.1 KU/L
ROACH IGE QN: <0.1 KU/L
SILVER BIRCH IGE QN: <0.1 KU/L
TIMOTHY IGE QN: <0.1 KU/L
WHITE OAK IGE QN: <0.1 KU/L

## 2023-11-14 LAB — AMER SYCAMORE IGE QN: <0.1 KU/L

## 2023-12-28 ENCOUNTER — PATIENT MESSAGE (OUTPATIENT)
Dept: OTOLARYNGOLOGY | Facility: CLINIC | Age: 63
End: 2023-12-28
Payer: COMMERCIAL